# Patient Record
(demographics unavailable — no encounter records)

---

## 2024-12-31 NOTE — ASSESSMENT
[Exercise/Effect on Glucose] : exercise/effect on glucose [Retinopathy Screening] : Patient was referred to ophthalmology for retinopathy screening [Weight Loss] : weight loss [FreeTextEntry1] : DM 2 in patient with HLD and obesity. Diarrhea with MF ER and IR. He stopped glimepiride due to making him fatigued.   DM2 : a1c 6.9 cont jardiance 25 mg qd and cont Ozempic to 2 mg /week  encouraged exercise walking 30 min 3 x week. gianluca normal. cont to monitor.  Low B12 level: continue  B12 500 mcg qd  eye exam utd   HLD: lipids very good. Cont  statin  Obesity: Discussed diet and exercise. Encouraged low carb diet   All lab results reviewed independently and discussed with patient with extensive discussion.  All questions answered Laboratory tests ordered today Continue other current medications

## 2025-03-31 NOTE — ASSESSMENT
[FreeTextEntry1] : DM 2 in patient with HLD and obesity. Diarrhea with MF ER and IR. He stopped glimepiride due to making him fatigued.   DM2 : a1c7.1 getting worse  cont jardiance 25 mg qd and cont Ozempic to 2 mg /week  encouraged exercise walking 30 min 3 x week. gianluca normal. cont to monitor.  Low B12 level: continue  B12 500 mcg qd   HLD: lipids very good. Cont  statin  Obesity: Discussed diet and exercise. Encouraged low carb diet   All lab results reviewed independently and discussed with patient with extensive discussion.  All questions answered Laboratory tests ordered today Continue other current medications

## 2025-06-24 NOTE — ASSESSMENT
[FreeTextEntry1] :  DM 2 in patient with HLD and obesity. Diarrhea with MF ER and IR. He stopped glimepiride due to making him fatigued.   DM2 : a1c 7.1  cont jardiance 25 mg qd and restart ozmepic Ozempic to 2 mg /week . Consider switching to mounajro  encouraged exercise walking 30 min 3 x week. gianluca normal. cont to monitor.  Low B12 level: continue  B12 500 mcg qd  complains of pain neuropathy in both legs. Cont B12 supplements. May need Neurontin.  referral to pain management ( has a doctor for his back injuries)   HLD: lipids very good. Cont  statin  Obesity: Discussed diet and exercise. Encouraged low carb diet .Restart glp1   All lab results reviewed independently and discussed with patient with extensive discussion.  All questions answered Laboratory tests ordered today Continue other current medications